# Patient Record
Sex: FEMALE | Race: WHITE
[De-identification: names, ages, dates, MRNs, and addresses within clinical notes are randomized per-mention and may not be internally consistent; named-entity substitution may affect disease eponyms.]

---

## 2018-06-04 ENCOUNTER — HOSPITAL ENCOUNTER (EMERGENCY)
Dept: HOSPITAL 84 - D.ER | Age: 23
Discharge: HOME | End: 2018-06-04
Payer: SELF-PAY

## 2018-06-04 VITALS
BODY MASS INDEX: 23.08 KG/M2 | BODY MASS INDEX: 23.08 KG/M2 | WEIGHT: 130.27 LBS | HEIGHT: 63 IN | WEIGHT: 130.27 LBS | HEIGHT: 63 IN

## 2018-06-04 VITALS — DIASTOLIC BLOOD PRESSURE: 74 MMHG | SYSTOLIC BLOOD PRESSURE: 123 MMHG

## 2018-06-04 DIAGNOSIS — M54.5: ICD-10-CM

## 2018-06-04 DIAGNOSIS — F17.200: ICD-10-CM

## 2018-06-04 DIAGNOSIS — R10.9: ICD-10-CM

## 2018-06-04 DIAGNOSIS — R10.2: ICD-10-CM

## 2018-06-04 DIAGNOSIS — N39.0: Primary | ICD-10-CM

## 2018-06-04 LAB
ALBUMIN SERPL-MCNC: 4.1 G/DL (ref 3.4–5)
ALP SERPL-CCNC: 51 U/L (ref 46–116)
ALT SERPL-CCNC: 29 U/L (ref 10–68)
ANION GAP SERPL CALC-SCNC: 11 MMOL/L (ref 8–16)
APPEARANCE UR: (no result)
BACTERIA #/AREA URNS HPF: (no result) /HPF
BASOPHILS NFR BLD AUTO: 0.1 % (ref 0–2)
BILIRUB SERPL-MCNC: 0.63 MG/DL (ref 0.2–1.3)
BILIRUB SERPL-MCNC: NEGATIVE MG/DL
BUN SERPL-MCNC: 10 MG/DL (ref 7–18)
CALCIUM SERPL-MCNC: 9.2 MG/DL (ref 8.5–10.1)
CHLORIDE SERPL-SCNC: 102 MMOL/L (ref 98–107)
CO2 SERPL-SCNC: 29.9 MMOL/L (ref 21–32)
COLOR UR: (no result)
CREAT SERPL-MCNC: 0.8 MG/DL (ref 0.6–1.3)
EOSINOPHIL NFR BLD: 0.9 % (ref 0–7)
ERYTHROCYTE [DISTWIDTH] IN BLOOD BY AUTOMATED COUNT: 12.2 % (ref 11.5–14.5)
GLOBULIN SER-MCNC: 3.8 G/L
GLUCOSE SERPL-MCNC: NEGATIVE MG/DL
HCG SERPL-ACNC: NEGATIVE M[IU]/ML
HCT VFR BLD CALC: 42 % (ref 36–48)
HGB BLD-MCNC: 14.5 G/DL (ref 12–16)
IMM GRANULOCYTES NFR BLD: 0.2 % (ref 0–5)
KETONES UR STRIP-MCNC: NEGATIVE MG/DL
LIPASE SERPL-CCNC: 80 U/L (ref 73–393)
LYMPHOCYTES NFR BLD AUTO: 19.5 % (ref 15–50)
MCH RBC QN AUTO: 30.5 PG (ref 26–34)
MCHC RBC AUTO-ENTMCNC: 34.5 G/DL (ref 31–37)
MCV RBC: 88.2 FL (ref 80–100)
MONOCYTES NFR BLD: 6 % (ref 2–11)
NEUTROPHILS NFR BLD AUTO: 73.3 % (ref 40–80)
NITRITE UR-MCNC: POSITIVE MG/ML
OSMOLALITY SERPL CALC.SUM OF ELEC: 276 MOSM/KG (ref 275–300)
PH UR STRIP: 6 [PH] (ref 5–6)
PLATELET # BLD: 252 10X3/UL (ref 130–400)
PMV BLD AUTO: 9.1 FL (ref 7.4–10.4)
POTASSIUM SERPL-SCNC: 3.9 MMOL/L (ref 3.5–5.1)
PROT SERPL-MCNC: 7.9 G/DL (ref 6.4–8.2)
PROT UR-MCNC: NEGATIVE MG/DL
RBC # BLD AUTO: 4.76 10X6/UL (ref 4–5.4)
RBC #/AREA URNS HPF: (no result) /HPF (ref 0–5)
SODIUM SERPL-SCNC: 139 MMOL/L (ref 136–145)
SP GR UR STRIP: 1.01 (ref 1–1.02)
SQUAMOUS #/AREA URNS HPF: (no result) /HPF (ref 0–5)
UROBILINOGEN UR-MCNC: NORMAL MG/DL
WBC # BLD AUTO: 9.1 10X3/UL (ref 4.8–10.8)

## 2019-06-01 ENCOUNTER — HOSPITAL ENCOUNTER (OUTPATIENT)
Dept: HOSPITAL 84 - D.LDO | Age: 24
Discharge: HOME | End: 2019-06-01
Payer: SELF-PAY

## 2019-06-01 DIAGNOSIS — Z3A.00: ICD-10-CM

## 2019-06-01 DIAGNOSIS — O26.899: Primary | ICD-10-CM

## 2019-06-01 DIAGNOSIS — R31.9: ICD-10-CM

## 2019-07-29 ENCOUNTER — HOSPITAL ENCOUNTER (OUTPATIENT)
Dept: HOSPITAL 84 - D.LDO | Age: 24
Discharge: HOME | End: 2019-07-29
Attending: OBSTETRICS & GYNECOLOGY
Payer: MEDICAID

## 2019-07-29 VITALS — BODY MASS INDEX: 23 KG/M2

## 2019-08-27 ENCOUNTER — HOSPITAL ENCOUNTER (INPATIENT)
Dept: HOSPITAL 84 - D.LDO | Age: 24
LOS: 3 days | Discharge: HOME | End: 2019-08-30
Attending: OBSTETRICS & GYNECOLOGY | Admitting: OBSTETRICS & GYNECOLOGY
Payer: MEDICAID

## 2019-08-27 VITALS — HEIGHT: 63 IN | BODY MASS INDEX: 31.01 KG/M2 | WEIGHT: 175 LBS | BODY MASS INDEX: 31.01 KG/M2

## 2019-08-27 DIAGNOSIS — A63.0: ICD-10-CM

## 2019-08-27 DIAGNOSIS — Z3A.35: ICD-10-CM

## 2019-08-27 LAB
ALT SERPL-CCNC: 14 U/L (ref 10–68)
ANION GAP SERPL CALC-SCNC: 13.1 MMOL/L (ref 8–16)
BASOPHILS NFR BLD AUTO: 0.1 % (ref 0–2)
BUN SERPL-MCNC: 6 MG/DL (ref 7–18)
CALCIUM SERPL-MCNC: 8.8 MG/DL (ref 8.5–10.1)
CHLORIDE SERPL-SCNC: 105 MMOL/L (ref 98–107)
CO2 SERPL-SCNC: 25.2 MMOL/L (ref 21–32)
CREAT SERPL-MCNC: 0.6 MG/DL (ref 0.6–1.3)
EOSINOPHIL NFR BLD: 0.4 % (ref 0–7)
ERYTHROCYTE [DISTWIDTH] IN BLOOD BY AUTOMATED COUNT: 12.4 % (ref 11.5–14.5)
GLUCOSE SERPL-MCNC: 78 MG/DL (ref 74–106)
HCT VFR BLD CALC: 37.6 % (ref 36–48)
HGB BLD-MCNC: 13 G/DL (ref 12–16)
IMM GRANULOCYTES NFR BLD: 0.3 % (ref 0–5)
LYMPHOCYTES NFR BLD AUTO: 20.9 % (ref 15–50)
MCH RBC QN AUTO: 29.7 PG (ref 26–34)
MCHC RBC AUTO-ENTMCNC: 34.6 G/DL (ref 31–37)
MCV RBC: 86 FL (ref 80–100)
MONOCYTES NFR BLD: 5.4 % (ref 2–11)
NEUTROPHILS NFR BLD AUTO: 72.9 % (ref 40–80)
OSMOLALITY SERPL CALC.SUM OF ELEC: 274 MOSM/KG (ref 275–300)
PLATELET # BLD: 222 10X3/UL (ref 130–400)
PMV BLD AUTO: 9.9 FL (ref 7.4–10.4)
POTASSIUM SERPL-SCNC: 4.3 MMOL/L (ref 3.5–5.1)
RBC # BLD AUTO: 4.37 10X6/UL (ref 4–5.4)
SODIUM SERPL-SCNC: 139 MMOL/L (ref 136–145)
URATE SERPL-MCNC: 3.3 MG/DL (ref 2.6–7.2)
WBC # BLD AUTO: 9.1 10X3/UL (ref 4.8–10.8)

## 2019-08-28 VITALS — DIASTOLIC BLOOD PRESSURE: 71 MMHG | SYSTOLIC BLOOD PRESSURE: 135 MMHG

## 2019-08-28 VITALS — DIASTOLIC BLOOD PRESSURE: 88 MMHG | SYSTOLIC BLOOD PRESSURE: 126 MMHG

## 2019-08-28 VITALS — SYSTOLIC BLOOD PRESSURE: 125 MMHG | DIASTOLIC BLOOD PRESSURE: 86 MMHG

## 2019-08-28 VITALS — SYSTOLIC BLOOD PRESSURE: 118 MMHG | DIASTOLIC BLOOD PRESSURE: 70 MMHG

## 2019-08-28 VITALS — DIASTOLIC BLOOD PRESSURE: 84 MMHG | SYSTOLIC BLOOD PRESSURE: 131 MMHG

## 2019-08-28 LAB
BASOPHILS NFR BLD AUTO: 0.1 % (ref 0–2)
EOSINOPHIL NFR BLD: 0 % (ref 0–7)
ERYTHROCYTE [DISTWIDTH] IN BLOOD BY AUTOMATED COUNT: 12.3 % (ref 11.5–14.5)
HCT VFR BLD CALC: 32.8 % (ref 36–48)
HGB BLD-MCNC: 11.5 G/DL (ref 12–16)
IMM GRANULOCYTES NFR BLD: 0.3 % (ref 0–5)
LYMPHOCYTES NFR BLD AUTO: 11.4 % (ref 15–50)
MCH RBC QN AUTO: 29.4 PG (ref 26–34)
MCHC RBC AUTO-ENTMCNC: 35.1 G/DL (ref 31–37)
MCV RBC: 83.9 FL (ref 80–100)
MONOCYTES NFR BLD: 5.4 % (ref 2–11)
NEUTROPHILS NFR BLD AUTO: 82.8 % (ref 40–80)
PLATELET # BLD: 184 10X3/UL (ref 130–400)
PMV BLD AUTO: 9.9 FL (ref 7.4–10.4)
RBC # BLD AUTO: 3.91 10X6/UL (ref 4–5.4)
WBC # BLD AUTO: 13.3 10X3/UL (ref 4.8–10.8)

## 2019-08-28 NOTE — NUR
received by bed to room from recovery, pt is awake and alert. Rates pain at 0
at this time, she is unable to move her feet or toes.  Fundus firm at u/2 with
light bleeding noted. IV to left wrist infusing pitocin per orders,  newton
cath to bedside drain with 100ml clear urine noted. family allowed to room,
side rails up x 2 with call light in reach.

## 2019-08-28 NOTE — NUR
fundus firm at u/1 with no clots noted. michael care per nurse, blue pad and
towels changed, pt is able to move her legs and raise her bottom up from bed.
Gown changed, and pt turned to her left side.  IV fluid volume cleared, newton
cath volume emptied with total out of 800ml clear urine.  rates pain at 2/10
friends at bedside.  side rails up x 2 with call light in reach.

## 2019-08-28 NOTE — NUR
PATIENT RESTING QUIETLY WITH EYES OPEN, SIGNIFICANT OTHER REMAINS AT BEDSIDE
FOR SUPPORT. PERICARE PERFORMED, BLEEDING SCANT RUBRA, NO CLOTS NOTED. CLEAN
PADS PLACED. GOMEZ CATHETER EMPTIED, SEE I&O. VS TAKEN WNL. BED REMAINS LOCKED
IN LOW POSITION, SIDE RAILS UPX2, CALL BELL AND TRAY TABLE IN REACH. WILL
CONTINUE TO MONITOR.

## 2019-08-28 NOTE — NUR
pharmacy called for dilaudid pca. house supervisor also contacted about pca.
pt rates pain at 7/10, fundus firm u/1 with light bleeding noted. newton cath
with 200ml clear urine noted. Pt moved up in bed with head of bed elevated per
her request. family remains at bedside, side rails up x2 and call light in
reach.

## 2019-08-28 NOTE — NUR
PATIENT VISITING WITH FAMILY, TORADOL 30MG ADMINISTERED SLOW IVP PER MD
ORDERS, SEE EMAR. BED REMAINS LOCKED IN LOW POSITION, SIDE RAILS UPX2,CALL
BELL AND TRAY TABLE IN REACH. WILL CONTINUE TO MONITOR.

## 2019-08-28 NOTE — NUR
SHIFT ASSESSMENT COMPLETED, SEE FLOWSHEET. PT WITH SIGNIFICANT OTHER AT
BEDSIDE FOR SUPPORT. BED REMAINS LOCKED IN LOW POSITION, SIDE RAILS UPX2, CALL
BELL AND TRAY TABLE IN REACH. WILL CONTINUE TO MONITOR.

## 2019-08-28 NOTE — NUR
ice pack to incision, bikini incision is covered with abd bandage that is
clean and dry. fundus firm at u/2 no clots noted with massage. pt rates pain
at 3/10.

## 2019-08-28 NOTE — NUR
fundus firm at u/1, light to moderate lochia noted, michael pad changed.  Dr Robles
has spoken to pt and family about transfer to [facility].

## 2019-08-28 NOTE — NUR
dilaudis pca started with 0.4mg bolus given per md orders.  pt demonstrates
use/understanding of pca button. Rates her pain at 9/10. fundus firm at u/1
light to moderate lochia noted, michael pad changed and new ice pack applied to
incision. call light in reach and sig other at bedside.

## 2019-08-29 VITALS — SYSTOLIC BLOOD PRESSURE: 99 MMHG | DIASTOLIC BLOOD PRESSURE: 57 MMHG

## 2019-08-29 VITALS — DIASTOLIC BLOOD PRESSURE: 77 MMHG | SYSTOLIC BLOOD PRESSURE: 128 MMHG

## 2019-08-29 VITALS — DIASTOLIC BLOOD PRESSURE: 77 MMHG | SYSTOLIC BLOOD PRESSURE: 125 MMHG

## 2019-08-29 VITALS — DIASTOLIC BLOOD PRESSURE: 79 MMHG | SYSTOLIC BLOOD PRESSURE: 124 MMHG

## 2019-08-29 VITALS — SYSTOLIC BLOOD PRESSURE: 128 MMHG | DIASTOLIC BLOOD PRESSURE: 70 MMHG

## 2019-08-29 LAB
BASOPHILS NFR BLD AUTO: 0.1 % (ref 0–2)
EOSINOPHIL NFR BLD: 0.8 % (ref 0–7)
ERYTHROCYTE [DISTWIDTH] IN BLOOD BY AUTOMATED COUNT: 12.3 % (ref 11.5–14.5)
HCT VFR BLD CALC: 31.3 % (ref 36–48)
HGB BLD-MCNC: 10.8 G/DL (ref 12–16)
IMM GRANULOCYTES NFR BLD: 0.4 % (ref 0–5)
LYMPHOCYTES NFR BLD AUTO: 14 % (ref 15–50)
MCH RBC QN AUTO: 29.3 PG (ref 26–34)
MCHC RBC AUTO-ENTMCNC: 34.5 G/DL (ref 31–37)
MCV RBC: 85.1 FL (ref 80–100)
MONOCYTES NFR BLD: 5 % (ref 2–11)
NEUTROPHILS NFR BLD AUTO: 79.7 % (ref 40–80)
PLATELET # BLD: 164 10X3/UL (ref 130–400)
PMV BLD AUTO: 9.8 FL (ref 7.4–10.4)
RBC # BLD AUTO: 3.68 10X6/UL (ref 4–5.4)
WBC # BLD AUTO: 8.2 10X3/UL (ref 4.8–10.8)

## 2019-08-29 NOTE — NUR
PT AWAKE. ASSESSMENT DONE. VERBAL RESPONSES APPRO TO QUESTIONS. JOSÉ AT WILL.
PT STATES IS USING PCA AS NEEDED. FAMILY AT BEDSIDE. STATES THAT PAIN IS A 7
ON SCALE OF 0-10 WHEN SITS UP. ABD SOFT. BOWEL SOUNDS PRESENT. SCANT LOCHIA
NOTED ON PAD. PT STATES THAT DR STONE HAS BEEN THRU TO SEE HER THIS
MORNING.

## 2019-08-29 NOTE — NUR
STATES THAT HEADACHE AND BACK ARE FEELING BETTER SINCE SHE IS SITTING UP IN
BED. REG DIET SERVED AND STATES THAT SHE IS READY TO EAT LUNCH.

## 2019-08-29 NOTE — NUR
NEW BAG OF IV FLUIDS HUNG AT THIS TIME, SEE EMAR. PT RESTING QUIETLY WITH EYES
OPEN, DENIES PAIN OR NEEDS, WILL CONTINUE TO MONITOR

## 2019-08-29 NOTE — NUR
ASSESSMENT PER FLOW SHEET, VS OBTAINED, SALINE LOCK IN LEFT WRIST INTACT WITH
NO REDNESS OR EDEMA, FF, ML, U/2, PT REPORTS LITE BLEEDING WITH NO CLOTS,
BIKINI INC WITH STAPLES CDI WITH YANE PAD OVER INC FOR COMFORT AND MOISTURE
CONTROL, PT REPORTS FLATUS, NO BM AND VOIDING WITH NO DIFFICULTY, PT RATES INC
PAIN 3/10, DENIES NEEDS FOR PAIN MED AT THIS TIME, BUT WOULD LIKE IT ADM
AROUND 8PM, INFORMED PT THAT I WILL COME BACK AROUND THAT TIME TO ADM, PT
VERBALIZES UNDERSTANDING, PT REQUESTED AND PROVIDED SHARPIE PEN TO FILL OUT
BANNER FOR DOOR, PT DENIES FURTHER NEEDS, S/O AT BEDSIDE, TRASH REMOVED

## 2019-08-29 NOTE — NUR
position on rt sode- vs done. states would like pain medication. rates pain a
5-6 at incision area. med given. states will shower after pain medication.

## 2019-08-29 NOTE — NUR
PT RESTING IN BED, S/O AT BEDSIDE, SALINE LOCK FLUSHED WITH 10MLS OF NS WITH
NO DIFFICULTY, PT DENIES NEEDS OR PAIN, PT STATES "DID THAT OTHER NURSE LET
YOU KNOW THAT I WANTED TO BE DISHCARGED FIRST THING IN THE MORNING", INFORMED
PT THAT SHE DID, AND WE WILL BE SURE AND PASS THAT ON TO THE DAY SHIFT NURSE,
PT VERBALIZES UNDERSTANDING

## 2019-08-29 NOTE — NUR
PT RINGS CALL LIGHT REQUESTING A NURSE TO THE ROOM. THIS RN TO BEDSIDE. PT
REPORTS DR STONE TOLD HER TO LET THE NURSES KNOW IF SHE WISHES TO BE
DISCHARGED TOMORROW, THERFORE PT IS NOW REQUESTING A DISCHARGE AS SOON AS
POSSIBLE IN THE AM SO SHE MAY GO SEE HER BABY IN [place]. THIS RN TO
NOTIFY AM SHIFT.

## 2019-08-29 NOTE — NUR
SITTING UP IN BED. IV CHANGED TO SALINE LOCK- FLUSHED WITH NS. GOMEZ CATH
REMOVED POST BULB DEFLATED-110OCC IN BAG. INSTRUCTED PT TO CALL BEFORE GETS UP
TO BATHROOM FOR FIRST TIME. NO REQUESTS AT THIS TIME.

## 2019-08-29 NOTE — NUR
ambulating in hallway- tolerating well. states feels better after shower.
states has headache and would like advil at this time-med given.

## 2019-08-30 VITALS — DIASTOLIC BLOOD PRESSURE: 67 MMHG | SYSTOLIC BLOOD PRESSURE: 118 MMHG

## 2019-08-30 VITALS — DIASTOLIC BLOOD PRESSURE: 83 MMHG | SYSTOLIC BLOOD PRESSURE: 137 MMHG

## 2019-08-30 VITALS — SYSTOLIC BLOOD PRESSURE: 109 MMHG | DIASTOLIC BLOOD PRESSURE: 70 MMHG

## 2019-08-30 NOTE — NUR
PT RESTING WITH EYES CLOSED, RESP QUIET, NO DISTRESS NOTED, LEFT UNDISTURBED
AT THIS TIME, S/O ASLEEP ON COUCH

## 2019-08-30 NOTE — NUR
READY TO GO HOME.  SAYS SHE IS FEELING BETTER.  2/10 HEADACHE.  SALINE LOCK
REMOVED WITH TIP INTACT.  JASPREET FARIAS RN WILL WHEEL PATIENT OUT WHEN READY.

## 2019-08-30 NOTE — NUR
PT LAYING IN BED, AWAKE, S/O AT BEDSIDE, VS OBTAINED, ADM PAIN MED PER MD
ORDERS, SEE EMAR, PT DENIES FURTHER NEEDS

## 2019-08-30 NOTE — NUR
DR DALTON TO ROOM. DISCUSSED POSSIBLE CAUSE OF HA AND RELIEF MEASURES TO INCLUDE
BLOOD PATCH OR HYDRATION/CAFFEINE AND RETURN IF NOT RESOLVED.  PATIENT DESIRES
TO TRY HYDRATION/CAFFEINE AND WILL RETURN IF NEEDED.  WILL NOTIFY DR STONE
OF PT COMPLAINT, ANESTHESIA ASSESSMENT-MANAGEMENT OPTIONS.

## 2019-08-30 NOTE — NUR
DR STONE RETURNED CALL AND ORDER WAS RECEIVED TO [facility].  CLINIC CONTACTED
TO AND SCHEDULED STAPLE REMOVAL FOR 5 SEPT 2019 @ 0800.

## 2019-08-30 NOTE — NUR
PT RESTING WITH EYES CLOSED, AROUSES TO SOFT VERBAL STIMULATION, VS OBTAINED,
C/O INC PAIN AND CRAMPING, ADM NORCO AND MOTRIN PER MD ORDERS, SEE EMAR PT
REQUESTED AND PROVIDED ANOTHER BLANKET, DENIES FURTHER NEEDS, S/O ASLEEP ON
COUCH

## 2019-08-30 NOTE — NUR
ASKED PT IF SHE NEEDED SOMETHING FOR PAIN PRIOR TO DC. SAYS SHE HAS A 6-7/10
THROBBING FRONTAL HA AND NECK ACH WHEN SITTING UP.  NORCO 10 MG GIVEN PO FOR
RELIEF AFTER DISCUSSING PAIN MANAGEMENT OPTIONS.  ASKED HER IF THIS HAD BEEN
EVALUATED BEFORE AND SHE SAID IT WAS YESTERDAY AM.  DC TEACHING COMPLETED TO
INCLUDE POST OP CARE, S&S INFECTION. PP DEPRESSION, DANGER SIGNS, MEDICATION
ADMINISTRATION AND F/U.  WILL CONTACT ANESTHESIA FOR EVALUATION.

## 2019-08-30 NOTE — NUR
DC'D VIA WHEELCHAIR TO HOME.  INFANT AT [facility], ALL BELONGINGS
REMOVED, HAS PRESCRIPTIONS AND DC INSTRUCTIONS.  IF CHANGES MIND AND DESIRES
BLOOD PATCH RETURN TO HOSPITAL ER OR CONTACT DR STONE'S OFFICE.

## 2019-08-30 NOTE — NUR
DR STONE ON L&D AND NOTIFIED OF PT COMPLAINT, EVALUATION BY DR DALTON AND
MANAGEMENT OPTIONS.  DR STONE TO ROOM AND TALKED TO PATIENT REGARDING
MANAGEMENT INCLUDING BLOOD PATCH.  RECOMMENDS PATIENT AMBULATE IN ROOM AND
DOMINGUEZ AND DETERMINE IF MELENDREZ GETS WORSE, RECOMMENDED CONSIDERATION OF BLOOD PATCH
IF HA IS WORSE.  PT VERBALIZED UNDERSTANDING.

## 2019-08-30 NOTE — NUR
CHECKED ON PATIENT.  WAS ON PHONE TALKING TO FAMILY MEMBER WHO EXPERIENCE
SIMILIAR HA AFTER HAVING C/S.  PT SHOWED THIS RN PICTURE OF PRESCRIPTION THAT
THE PERSON WAS GIVEN. PT ASKED IF SHE COULD GET THIS RX.  CONTACTED DR STONE TO LET HIM KNOW ABOVE INFORMATION (BUTALBITAL/ACETAMINOPHEN/CAFFEINE)
RX.  NO NEW ORDERS RECEIVED.  PT TO CONTINUE WITH CURRENT PAIN MEDICATION AND
CAFFEINE. PT NOTIFIED.  WILL TAKE SHOWER AND WALK AROUND.  4/10 HA WHEN LAYING
DOWN.  SAYS SHE DOES NOT WANT TO BE POKED IN BACK.  VISITOR X 1 IN ROOM.  TO
CALL IF ANYTHING IS NEEDED.

## 2019-08-30 NOTE — NUR
ASSUMED CARE OF THIS PATIENT.  DR STONE CURRENTLY IN ROOM DISCUSSING
DISCHARGE WITH PT.  SHIFT ASSESSMENT COMPLETED AFTER MD VISIT.  PLANS DC HOME
TODAY. INFANT CURRENTLY AT HOSPITAL IN [place].   35 WKS DEL C/S 28
AUG @ 1415.  A+ RUBELLA IMMUNE, GBS UNKNOWN.  TDAP DUE 2024 PER [place]
[employer] WEBSITE RECORD.  NON SMOKER, PLANS TO BREAST AND BOTTLE INFANT.
FOB ASLEEP ON COUCH. SIDE RAILS UP X 2, CALL LIGHT IN REACH. TO CALL IF
ANYTHING IS NEEDED.

## 2019-09-16 NOTE — OP
PATIENT NAME:  PAYAL ALVAREZ                        MEDICAL RECORD: [id number]
:[birth year]                                             LOCATION:[id number]
                                                         ADMISSION DATE:19
SURGEON:  ADDISON VARGAS MD        
 
 
DATE OF OPERATION:  2019
 
PREOPERATIVE DIAGNOSES:
1.  Fetal nuchal cord with minimal loose cord noted on ultrasound examination.
2.  Fetal variable decelerations.
3.   pregnancy at 35 weeks.
 
POSTOPERATIVE DIAGNOSES:
1.  Fetal nuchal cord with minimal loose cord noted on ultrasound examination.
2.  Fetal variable decelerations.
3.   pregnancy at 35 weeks
4.  Nuchal cord times 4.
 
PROCEDURE:  Primary low transverse  section.
 
SURGEON:  Addison Vargas MD
 
ESTIMATED BLOOD LOSS:  1000 cc.
 
INTRAVENOUS FLUIDS:  Per anesthesia record.
 
SPECIMENS:  Placenta and cord for gases.
 
FINDINGS:
1.  A viable infant was noted.
2.  Fetal umbilical cord was noted to be 4+ times wrapped around the neck
resulting in extreme tension between the fetal attachment and the fetal neck. 
Also noted was minimal cord remaining from the fetal nuchal cord to the
placental attachment resulting in a tethering of the fetus.
3.  Grossly normal adnexa bilaterally.
 
COMPLICATIONS:  None apparent.
 
DESCRIPTION OF PROCEDURE:  The patient was taken to the operating room where
regional anesthesia was achieved without difficulty via spinal.  The patient was
prepped and draped in normal sterile fashion.  SCDs were on and functioning
normally.  A Murray catheter had been in place and was draining freely.  At this
point, a Pfannenstiel skin incision was made by excising the skin and the
subcutaneous fat to the level of the fascia.  The fascia was then nicked in the
midline with scalpel and extended bilaterally using the Navarrete scissors.  Superior
and inferior aspects of the fascial incision were then grasped with Kocher
clamps times 2, tented upward, and sharply dissected from the underlying rectus
muscle using the Bovie cautery and the Navarrete scissors.  Rectus muscles were then
 bluntly in the midline and the peritoneum entered sharply at the
superior aspect of the incision using the Metzenbaum scissors.  The peritoneal
incision was then extended using the Metzenbaum scissors and a bladder flap was
created by excising the anterior leaf of the broad ligament across the lower
uterine segment.  A bladder blade was placed into the pelvis and a low
transverse incision was made with a scalpel and extended using the Pelosi
method.  The fetal head was delivered atraumatically.  However, at approximately
the level of the hips, tension was noted to be on the umbilical cord secondary
to the extreme number of nuchal rotations.  These were then relieved by 2 or 3
 
 
 
OPERATIVE REPORT                               [id number]    PAYAL ALVAREZ      
 
 
to fully deliver the baby from the uterus.  Cord was clamped times 2, cut, and
the infant was handed to the awaiting nursery team.  Cord was then obtained for
gases.  The placenta was removed manually intact.  The 3-vessel cord was noted. 
The uterus was vigorously massaged.  A good uterine tone was noted.  Uterine
incision was repaired with 0 Vicryl in a running locked fashion times 2 with
good hemostasis noted.  Posterior cul-de-sac was then thoroughly irrigated and
uterus was replaced into the pelvis.  The anterior cul-de-sac was then
thoroughly irrigated and good uterine tone and hemostasis was noted.  Counts
were correct times 2 for needles, sponges, and instruments.  The fascia was
repaired with 0 loop PDS and the skin repaired with a running subcutaneous
stitch using 3-0 Monocryl and Dermabond.  The patient tolerated the procedure
well, transferred to postanesthesia recovery stable without incident.
 
TRANSINT:[id number] Voice Confirmation ID: [id number] DOCUMENT ID: [id number]
                                           
                                           ADDISON VARGAS MD        
 
 
 
Electronically Signed by ADDISON VARGAS on 19 at 1326
 
 
 
 
 
 
 
 
 
 
 
 
 
 
 
 
 
 
 
 
 
 
 
 
 
 
 
CC:                                                             [id number]
DICTATION DATE: 19 1012     :     19 1141      DIS IN  
                                                                      19
[id number] [place] [address]

## 2023-09-26 NOTE — NUR
Medication: Metoprolol   Last office visit date: 06/14/2022  Medication Refill Protocol Failed.  Failed criteria: eGFR within last 12 months looking at last value. Sent to clinician to review.  Seen by prescribing provider or same department within the last 12 months or has a future appt in 3 months - IF FAILED PLEASE LOOK AT CHART REVIEW FOR LAST VISIT AND PROCEED ACCORDINGLY       states is ready to take pain medication at this time- rates pain a 3 on scale
of 0-10.